# Patient Record
Sex: FEMALE | Race: BLACK OR AFRICAN AMERICAN | NOT HISPANIC OR LATINO | URBAN - METROPOLITAN AREA
[De-identification: names, ages, dates, MRNs, and addresses within clinical notes are randomized per-mention and may not be internally consistent; named-entity substitution may affect disease eponyms.]

---

## 2020-10-19 ENCOUNTER — MEDICAL CORRESPONDENCE (OUTPATIENT)
Dept: HEALTH INFORMATION MANAGEMENT | Facility: CLINIC | Age: 45
End: 2020-10-19

## 2020-10-20 ENCOUNTER — TRANSCRIBE ORDERS (OUTPATIENT)
Dept: OTHER | Age: 45
End: 2020-10-20

## 2020-10-20 DIAGNOSIS — Z00.00 GENERAL MEDICAL EXAM: Primary | ICD-10-CM

## 2020-10-22 ENCOUNTER — OFFICE VISIT (OUTPATIENT)
Dept: OPHTHALMOLOGY | Facility: CLINIC | Age: 45
End: 2020-10-22
Attending: INTERNAL MEDICINE
Payer: MEDICAID

## 2020-10-22 DIAGNOSIS — H52.203 MYOPIA OF BOTH EYES WITH ASTIGMATISM AND PRESBYOPIA: Primary | ICD-10-CM

## 2020-10-22 DIAGNOSIS — Z00.00 GENERAL MEDICAL EXAM: ICD-10-CM

## 2020-10-22 DIAGNOSIS — H52.13 MYOPIA OF BOTH EYES WITH ASTIGMATISM AND PRESBYOPIA: Primary | ICD-10-CM

## 2020-10-22 DIAGNOSIS — H52.4 MYOPIA OF BOTH EYES WITH ASTIGMATISM AND PRESBYOPIA: Primary | ICD-10-CM

## 2020-10-22 PROBLEM — E03.9 ACQUIRED HYPOTHYROIDISM: Status: ACTIVE | Noted: 2018-04-09

## 2020-10-22 PROCEDURE — 92014 COMPRE OPH EXAM EST PT 1/>: CPT | Performed by: OPTOMETRIST

## 2020-10-22 PROCEDURE — 92015 DETERMINE REFRACTIVE STATE: CPT | Performed by: OPTOMETRIST

## 2020-10-22 RX ORDER — LEVOTHYROXINE SODIUM 150 UG/1
150 TABLET ORAL
COMMUNITY
Start: 2020-10-19 | End: 2020-10-25

## 2020-10-22 ASSESSMENT — VISUAL ACUITY
OD_SC+: +2
OS_SC+: -1
OD_SC: 20/30
METHOD: SNELLEN - LINEAR
OS_SC: 20/30

## 2020-10-22 ASSESSMENT — TONOMETRY
OD_IOP_MMHG: 18
OS_IOP_MMHG: 23
IOP_METHOD: TONOPEN

## 2020-10-22 ASSESSMENT — CONF VISUAL FIELD
OD_NORMAL: 1
METHOD: COUNTING FINGERS
OS_NORMAL: 1

## 2020-10-22 ASSESSMENT — REFRACTION_MANIFEST
OS_CYLINDER: +0.50
OS_ADD: +1.50
OD_ADD: +1.50
OD_CYLINDER: +0.75
OD_AXIS: 135
OS_SPHERE: -1.25
OS_AXIS: 065
OD_SPHERE: -1.00

## 2020-10-22 ASSESSMENT — EXTERNAL EXAM - RIGHT EYE: OD_EXAM: NORMAL

## 2020-10-22 ASSESSMENT — EXTERNAL EXAM - LEFT EYE: OS_EXAM: NORMAL

## 2020-10-22 ASSESSMENT — CUP TO DISC RATIO
OD_RATIO: 0.55
OS_RATIO: 0.50

## 2020-10-22 ASSESSMENT — SLIT LAMP EXAM - LIDS
COMMENTS: NORMAL
COMMENTS: NORMAL

## 2020-10-22 NOTE — NURSING NOTE
Chief Complaints and History of Present Illnesses   Patient presents with     Yearly Exam     Chief Complaint(s) and History of Present Illness(es)     Yearly Exam     Laterality: both eyes    Onset: gradual    Onset: years ago    Quality: States that near va has decreased over time    Associated symptoms: floaters.  Negative for redness, dryness, tearing and flashes    Pain scale: 0/10              Comments     Tatum BOCANEGRA 8:52 AM October 22, 2020

## 2020-10-22 NOTE — PROGRESS NOTES
A/P  1.) Myopia/Astig/Presbyopia each eye  -Mild spec Rx, updated today. Mainly noticing decreased vision at near, some distance but managing okay  -Rec progressives for best overall vision (adaptation warning given), but could do separate DVO/NVO if desired  -Dilated ocular health unremarkable each eye    Monitor 1-2 years routine, sooner prn    I have confirmed the patient's CC, HPI and reviewed Past Medical History, Past Surgical History, Social History, Family History, Problem List, Medication List and agree with Tech note.     Bisi Barcenas, OD FAAO [facility]

## 2021-08-23 ENCOUNTER — LAB REQUISITION (OUTPATIENT)
Dept: LAB | Facility: CLINIC | Age: 46
End: 2021-08-23
Payer: COMMERCIAL

## 2021-08-23 DIAGNOSIS — E03.9 HYPOTHYROIDISM, UNSPECIFIED: ICD-10-CM

## 2021-08-23 LAB — TSH SERPL DL<=0.005 MIU/L-ACNC: 0.73 MU/L (ref 0.4–4)

## 2021-08-23 PROCEDURE — 36415 COLL VENOUS BLD VENIPUNCTURE: CPT | Mod: ORL | Performed by: FAMILY MEDICINE

## 2021-08-23 PROCEDURE — 84443 ASSAY THYROID STIM HORMONE: CPT | Mod: ORL | Performed by: FAMILY MEDICINE

## 2022-02-23 ENCOUNTER — LAB REQUISITION (OUTPATIENT)
Dept: LAB | Facility: CLINIC | Age: 47
End: 2022-02-23
Payer: COMMERCIAL

## 2022-02-23 DIAGNOSIS — E03.9 HYPOTHYROIDISM, UNSPECIFIED: ICD-10-CM

## 2022-02-23 LAB
FSH SERPL-ACNC: 13.9 IU/L
LH SERPL-ACNC: 4 IU/L
T4 FREE SERPL-MCNC: 1.03 NG/DL (ref 0.76–1.46)
TSH SERPL DL<=0.005 MIU/L-ACNC: 1.15 MU/L (ref 0.4–4)

## 2022-02-23 PROCEDURE — 83002 ASSAY OF GONADOTROPIN (LH): CPT | Performed by: FAMILY MEDICINE

## 2022-02-23 PROCEDURE — 84439 ASSAY OF FREE THYROXINE: CPT | Mod: ORL | Performed by: FAMILY MEDICINE

## 2022-02-23 PROCEDURE — 84443 ASSAY THYROID STIM HORMONE: CPT | Mod: ORL | Performed by: FAMILY MEDICINE

## 2022-02-23 PROCEDURE — 83001 ASSAY OF GONADOTROPIN (FSH): CPT | Mod: ORL | Performed by: FAMILY MEDICINE

## 2023-06-09 ENCOUNTER — MEDICAL CORRESPONDENCE (OUTPATIENT)
Dept: HEALTH INFORMATION MANAGEMENT | Facility: CLINIC | Age: 48
End: 2023-06-09
Payer: COMMERCIAL

## 2023-06-09 ENCOUNTER — LAB REQUISITION (OUTPATIENT)
Dept: LAB | Facility: CLINIC | Age: 48
End: 2023-06-09
Payer: COMMERCIAL

## 2023-06-09 DIAGNOSIS — Z13.220 ENCOUNTER FOR SCREENING FOR LIPOID DISORDERS: ICD-10-CM

## 2023-06-09 DIAGNOSIS — E03.9 HYPOTHYROIDISM, UNSPECIFIED: ICD-10-CM

## 2023-06-09 PROCEDURE — 84443 ASSAY THYROID STIM HORMONE: CPT | Mod: ORL | Performed by: FAMILY MEDICINE

## 2023-06-09 PROCEDURE — 80061 LIPID PANEL: CPT | Mod: ORL | Performed by: FAMILY MEDICINE

## 2023-06-12 ENCOUNTER — TRANSCRIBE ORDERS (OUTPATIENT)
Dept: OTHER | Age: 48
End: 2023-06-12

## 2023-06-12 DIAGNOSIS — H54.7 WORSENING VISION: Primary | ICD-10-CM

## 2023-06-12 DIAGNOSIS — M79.652 BILATERAL THIGH PAIN: Primary | ICD-10-CM

## 2023-06-12 DIAGNOSIS — M79.651 BILATERAL THIGH PAIN: Primary | ICD-10-CM

## 2023-06-13 LAB
CHOLEST SERPL-MCNC: 215 MG/DL
HDLC SERPL-MCNC: 37 MG/DL
LDLC SERPL CALC-MCNC: 139 MG/DL
NONHDLC SERPL-MCNC: 178 MG/DL
TRIGL SERPL-MCNC: 196 MG/DL
TSH SERPL DL<=0.005 MIU/L-ACNC: 1.4 UIU/ML (ref 0.3–4.2)

## 2023-07-11 ENCOUNTER — HOSPITAL ENCOUNTER (OUTPATIENT)
Dept: GENERAL RADIOLOGY | Facility: CLINIC | Age: 48
Discharge: HOME | End: 2023-07-11
Attending: INTERNAL MEDICINE

## 2023-07-11 DIAGNOSIS — Z86.15 HISTORY OF LATENT TUBERCULOSIS: ICD-10-CM

## 2023-07-11 PROCEDURE — 99207 XR CHEST 1 VIEW, EMPLOYEE HEALTH: CPT | Mod: GC | Performed by: RADIOLOGY

## 2023-07-11 PROCEDURE — 999N000028 XR CHEST 1 VIEW, EMPLOYEE HEALTH

## 2023-07-26 ENCOUNTER — ANCILLARY PROCEDURE (OUTPATIENT)
Dept: GENERAL RADIOLOGY | Facility: CLINIC | Age: 48
End: 2023-07-26
Attending: INTERNAL MEDICINE
Payer: COMMERCIAL

## 2023-07-26 DIAGNOSIS — R76.12 POSITIVE QUANTIFERON-TB GOLD TEST: ICD-10-CM

## 2023-07-26 PROCEDURE — 71046 X-RAY EXAM CHEST 2 VIEWS: CPT | Mod: TC | Performed by: RADIOLOGY

## 2023-09-12 ENCOUNTER — TELEPHONE (OUTPATIENT)
Dept: OPTOMETRY | Facility: CLINIC | Age: 48
End: 2023-09-12

## 2023-09-12 NOTE — TELEPHONE ENCOUNTER
Spoke with patient regarding confirming appt for 9/14 patient stated with her  that they both need to reschedule due to going out of town. Rescheduled patients accordingly to 11/10. Patient aware of new appt time, date, and location.